# Patient Record
Sex: MALE | Race: WHITE | NOT HISPANIC OR LATINO | Employment: PART TIME | URBAN - METROPOLITAN AREA
[De-identification: names, ages, dates, MRNs, and addresses within clinical notes are randomized per-mention and may not be internally consistent; named-entity substitution may affect disease eponyms.]

---

## 2023-09-28 ENCOUNTER — OFFICE VISIT (OUTPATIENT)
Dept: URGENT CARE | Facility: CLINIC | Age: 18
End: 2023-09-28
Payer: COMMERCIAL

## 2023-09-28 VITALS
OXYGEN SATURATION: 99 % | HEART RATE: 98 BPM | TEMPERATURE: 99.4 F | HEIGHT: 71 IN | WEIGHT: 177 LBS | DIASTOLIC BLOOD PRESSURE: 55 MMHG | SYSTOLIC BLOOD PRESSURE: 114 MMHG | BODY MASS INDEX: 24.78 KG/M2 | RESPIRATION RATE: 18 BRPM

## 2023-09-28 DIAGNOSIS — R42 VERTIGO: Primary | ICD-10-CM

## 2023-09-28 PROCEDURE — 99203 OFFICE O/P NEW LOW 30 MIN: CPT | Performed by: PREVENTIVE MEDICINE

## 2023-09-28 NOTE — PROGRESS NOTES
[facility]        NAME: Marilia Sen is a 19 y.o. male  : 2005    MRN: [id number]  DATE: 2023  TIME: 6:36 PM    Assessment and Plan   Vertigo [R42]  1. Vertigo              Patient Instructions       Follow up with PCP in 3-5 days. Proceed to  ER if symptoms worsen. Chief Complaint     Chief Complaint   Patient presents with   • Cold Like Symptoms     Sinus pressure, headache and nausea started yesterday. History of Present Illness       2 days of mild vertigo particularly in the morning. Some head congestion. No fever. Review of Systems   Review of Systems   Neurological: Positive for dizziness. Current Medications     No current outpatient medications on file. Current Allergies     Allergies as of 2023   • (No Known Allergies)            The following portions of the patient's history were reviewed and updated as appropriate: allergies, current medications, past family history, past medical history, past social history, past surgical history and problem list.     History reviewed. No pertinent past medical history. History reviewed. No pertinent surgical history. Family History   Problem Relation Age of Onset   • Heart disease Mother    • Diabetes Mother    • No Known Problems Father          Medications have been verified. Objective   /55   Pulse 98   Temp 99.4 °F (37.4 °C)   Resp 18   Ht 5' 11" (1.803 m)   Wt 80.3 kg (177 lb)   SpO2 99%   BMI 24.69 kg/m²   No LMP for male patient. Physical Exam     Physical Exam  HENT:      Right Ear: Tympanic membrane normal.      Left Ear: Tympanic membrane normal.   Eyes:      Pupils: Pupils are equal, round, and reactive to light.       Comments: No vertical or horizontal nystagmus

## 2023-09-28 NOTE — PATIENT INSTRUCTIONS
These are usually viral.  You can medicate it with Antivert 25 mg otherwise known as Bonine or meclizine. 1 every 6-8 hours for dizziness.